# Patient Record
Sex: MALE | URBAN - METROPOLITAN AREA
[De-identification: names, ages, dates, MRNs, and addresses within clinical notes are randomized per-mention and may not be internally consistent; named-entity substitution may affect disease eponyms.]

---

## 2018-11-12 ENCOUNTER — OFFICE VISIT - HEALTHEAST (OUTPATIENT)
Dept: FAMILY MEDICINE | Facility: CLINIC | Age: 23
End: 2018-11-12

## 2018-11-12 DIAGNOSIS — J02.9 PHARYNGITIS, UNSPECIFIED ETIOLOGY: ICD-10-CM

## 2018-11-12 DIAGNOSIS — J02.9 SORE THROAT: ICD-10-CM

## 2018-11-12 DIAGNOSIS — J34.2 DEVIATED NASAL SEPTUM: ICD-10-CM

## 2018-11-12 LAB — DEPRECATED S PYO AG THROAT QL EIA: NORMAL

## 2018-11-12 ASSESSMENT — MIFFLIN-ST. JEOR: SCORE: 1771.83

## 2018-11-13 LAB — GROUP A STREP BY PCR: NORMAL

## 2018-11-14 ENCOUNTER — OFFICE VISIT - HEALTHEAST (OUTPATIENT)
Dept: OTOLARYNGOLOGY | Facility: CLINIC | Age: 23
End: 2018-11-14

## 2018-11-14 DIAGNOSIS — M95.0 ACQUIRED NASAL DEFORMITY: ICD-10-CM

## 2018-11-14 DIAGNOSIS — J34.2 DEVIATED NASAL SEPTUM: ICD-10-CM

## 2019-01-18 ENCOUNTER — OFFICE VISIT - HEALTHEAST (OUTPATIENT)
Dept: FAMILY MEDICINE | Facility: CLINIC | Age: 24
End: 2019-01-18

## 2019-01-18 DIAGNOSIS — R19.7 VOMITING AND DIARRHEA: ICD-10-CM

## 2019-01-18 DIAGNOSIS — R11.10 VOMITING AND DIARRHEA: ICD-10-CM

## 2019-01-18 ASSESSMENT — MIFFLIN-ST. JEOR: SCORE: 1744.32

## 2019-03-12 ENCOUNTER — OFFICE VISIT - HEALTHEAST (OUTPATIENT)
Dept: FAMILY MEDICINE | Facility: CLINIC | Age: 24
End: 2019-03-12

## 2019-03-12 DIAGNOSIS — R11.2 NON-INTRACTABLE VOMITING WITH NAUSEA, UNSPECIFIED VOMITING TYPE: ICD-10-CM

## 2019-03-28 ENCOUNTER — OFFICE VISIT - HEALTHEAST (OUTPATIENT)
Dept: FAMILY MEDICINE | Facility: CLINIC | Age: 24
End: 2019-03-28

## 2019-03-28 DIAGNOSIS — F43.22 ADJUSTMENT DISORDER WITH ANXIOUS MOOD: ICD-10-CM

## 2019-03-28 ASSESSMENT — MIFFLIN-ST. JEOR: SCORE: 1694.43

## 2019-05-21 ENCOUNTER — OFFICE VISIT - HEALTHEAST (OUTPATIENT)
Dept: FAMILY MEDICINE | Facility: CLINIC | Age: 24
End: 2019-05-21
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
## 2019-05-21 DIAGNOSIS — S06.0X0A CONCUSSION WITHOUT LOSS OF CONSCIOUSNESS, INITIAL ENCOUNTER: ICD-10-CM

## 2019-05-21 ASSESSMENT — MIFFLIN-ST. JEOR: SCORE: 1712.57

## 2019-07-09 ENCOUNTER — OFFICE VISIT - HEALTHEAST (OUTPATIENT)
Dept: FAMILY MEDICINE | Facility: CLINIC | Age: 24
End: 2019-07-09

## 2019-07-09 DIAGNOSIS — Z01.818 PRE-OPERATIVE GENERAL PHYSICAL EXAMINATION: ICD-10-CM

## 2019-07-09 DIAGNOSIS — B07.0 PLANTAR WART: ICD-10-CM

## 2019-07-09 DIAGNOSIS — S52.322A CLOSED DISPLACED TRANSVERSE FRACTURE OF SHAFT OF LEFT RADIUS, INITIAL ENCOUNTER: ICD-10-CM

## 2019-07-09 ASSESSMENT — MIFFLIN-ST. JEOR: SCORE: 1730.15

## 2019-07-10 ENCOUNTER — COMMUNICATION - HEALTHEAST (OUTPATIENT)
Dept: FAMILY MEDICINE | Facility: CLINIC | Age: 24
End: 2019-07-10

## 2019-07-31 ENCOUNTER — OFFICE VISIT - HEALTHEAST (OUTPATIENT)
Dept: FAMILY MEDICINE | Facility: CLINIC | Age: 24
End: 2019-07-31

## 2019-07-31 DIAGNOSIS — B07.0 PLANTAR WARTS: ICD-10-CM

## 2019-07-31 ASSESSMENT — MIFFLIN-ST. JEOR: SCORE: 1716.54

## 2019-10-01 ENCOUNTER — OFFICE VISIT - HEALTHEAST (OUTPATIENT)
Dept: FAMILY MEDICINE | Facility: CLINIC | Age: 24
End: 2019-10-01

## 2019-10-01 DIAGNOSIS — G43.711 INTRACTABLE CHRONIC MIGRAINE WITHOUT AURA AND WITH STATUS MIGRAINOSUS: ICD-10-CM

## 2019-10-01 DIAGNOSIS — B07.0 PLANTAR WARTS: ICD-10-CM

## 2019-10-01 DIAGNOSIS — R11.0 NAUSEA: ICD-10-CM

## 2019-10-01 ASSESSMENT — ANXIETY QUESTIONNAIRES
1. FEELING NERVOUS, ANXIOUS, OR ON EDGE: NOT AT ALL
4. TROUBLE RELAXING: SEVERAL DAYS
6. BECOMING EASILY ANNOYED OR IRRITABLE: NOT AT ALL
2. NOT BEING ABLE TO STOP OR CONTROL WORRYING: NOT AT ALL
3. WORRYING TOO MUCH ABOUT DIFFERENT THINGS: SEVERAL DAYS
GAD7 TOTAL SCORE: 3
IF YOU CHECKED OFF ANY PROBLEMS ON THIS QUESTIONNAIRE, HOW DIFFICULT HAVE THESE PROBLEMS MADE IT FOR YOU TO DO YOUR WORK, TAKE CARE OF THINGS AT HOME, OR GET ALONG WITH OTHER PEOPLE: SOMEWHAT DIFFICULT
5. BEING SO RESTLESS THAT IT IS HARD TO SIT STILL: SEVERAL DAYS
7. FEELING AFRAID AS IF SOMETHING AWFUL MIGHT HAPPEN: NOT AT ALL

## 2019-10-01 ASSESSMENT — PATIENT HEALTH QUESTIONNAIRE - PHQ9: SUM OF ALL RESPONSES TO PHQ QUESTIONS 1-9: 1

## 2019-10-01 ASSESSMENT — MIFFLIN-ST. JEOR: SCORE: 1739.22

## 2020-03-12 ENCOUNTER — OFFICE VISIT - HEALTHEAST (OUTPATIENT)
Dept: FAMILY MEDICINE | Facility: CLINIC | Age: 25
End: 2020-03-12

## 2020-03-12 ENCOUNTER — COMMUNICATION - HEALTHEAST (OUTPATIENT)
Dept: SCHEDULING | Facility: CLINIC | Age: 25
End: 2020-03-12

## 2020-03-12 DIAGNOSIS — R41.840 POOR CONCENTRATION: ICD-10-CM

## 2020-03-12 DIAGNOSIS — J02.9 SORE THROAT: ICD-10-CM

## 2020-03-12 LAB — DEPRECATED S PYO AG THROAT QL EIA: NORMAL

## 2020-03-12 ASSESSMENT — ANXIETY QUESTIONNAIRES
4. TROUBLE RELAXING: NOT AT ALL
3. WORRYING TOO MUCH ABOUT DIFFERENT THINGS: MORE THAN HALF THE DAYS
IF YOU CHECKED OFF ANY PROBLEMS ON THIS QUESTIONNAIRE, HOW DIFFICULT HAVE THESE PROBLEMS MADE IT FOR YOU TO DO YOUR WORK, TAKE CARE OF THINGS AT HOME, OR GET ALONG WITH OTHER PEOPLE: SOMEWHAT DIFFICULT
2. NOT BEING ABLE TO STOP OR CONTROL WORRYING: SEVERAL DAYS
6. BECOMING EASILY ANNOYED OR IRRITABLE: SEVERAL DAYS
5. BEING SO RESTLESS THAT IT IS HARD TO SIT STILL: SEVERAL DAYS
1. FEELING NERVOUS, ANXIOUS, OR ON EDGE: SEVERAL DAYS
7. FEELING AFRAID AS IF SOMETHING AWFUL MIGHT HAPPEN: NOT AT ALL
GAD7 TOTAL SCORE: 6

## 2020-03-12 ASSESSMENT — MIFFLIN-ST. JEOR: SCORE: 1712.01

## 2020-03-12 ASSESSMENT — PATIENT HEALTH QUESTIONNAIRE - PHQ9: SUM OF ALL RESPONSES TO PHQ QUESTIONS 1-9: 8

## 2020-03-13 LAB — GROUP A STREP BY PCR: NORMAL

## 2020-03-25 ENCOUNTER — VIRTUAL VISIT (OUTPATIENT)
Dept: FAMILY MEDICINE | Facility: OTHER | Age: 25
End: 2020-03-25

## 2020-03-25 NOTE — PROGRESS NOTES
"Date: 2020 13:22:42  Clinician: Oz Emmanuel  Clinician NPI: [id number]  Patient: Aureliano Silverio  Patient : 1995  Patient Address: [address] [place] [id number]  Patient Phone: [phone]  Visit Protocol: URI  Patient Summary:  Aureliano is a 25 year old ( : 1995 ) male who initiated a Visit for COVID-19 (Coronavirus) evaluation and screening. When asked the question \"Please sign me up to receive news, health information and promotions from [employer].\", Aureliano responded \"Yes\".    Aureliano states his symptoms started gradually 7-9 days ago. After his symptoms started, they improved and then got worse again.   His symptoms consist of malaise, chills, ear pain, rhinitis, wheezing, a sore throat, a cough, and nasal congestion. He is experiencing mild difficulty breathing with activities but can speak normally in full sentences.   Symptom details     Nasal secretions: The color of his mucus is yellow and green.    Cough: Aureliano coughs every 5-10 minutes and his cough is not more bothersome at night. Phlegm comes into his throat when he coughs. He does not believe his cough is caused by post-nasal drip. The color of the phlegm is yellow and green.     Sore throat: Aureliano reports having mild throat pain (1-3 on a 10 point pain scale), does not have exudate on his tonsils, and can swallow liquids. He is not sure if the lymph nodes in his neck are enlarged. A rash has not appeared on the skin since the sore throat started.     Wheezing: Aureliano has not ever been diagnosed with asthma. The wheezing interferes with his normal daily activities.     Aureliano denies having teeth pain, headache, fever, facial pain or pressure, and myalgias. He also denies having a sinus infection within the past year, taking antibiotic medication for the symptoms, and having recent facial or sinus surgery in the past 60 days.   Precipitating events  Within the past week, Aureliano has not been exposed to someone with strep throat. He has " not recently been exposed to someone with influenza. Aureliano has been in close contact with the following high risk individuals: immunocompromised people.   Pertinent COVID-19 (Coronavirus) information  Aureliano has not traveled internationally or to the areas where COVID-19 (Coronavirus) is widespread, including cruise ship travel in the last 14 days before the start of his symptoms.   Aureliano has not had a close contact with a laboratory-confirmed COVID-19 patient within 14 days of symptom onset. He has had a close contact with a suspected COVID-19 patient within 14 days of symptom onset. Additional information about contact with COVID-19 (Coronavirus) patient as reported by the patient (free text): A coworker was sick and had to get tested for COVID-19 and tested negative.   Aureliano is not a healthcare worker or a  and does not work in a healthcare facility. He is not a family member of a healthcare worker and does not live with someone who is a healthcare worker.   Triage Point(s) temporarily suspended for COVID-19 (Coronavirus) screening  Aureliano reported the following symptoms which were previously protocol referral points. These protocol referral points have temporarily been removed for purposes of COVID-19 (Coronavirus) screening.   Wheezing that keeps Aureliano from doing daily activities   Pertinent medical history  Aureliano does not need a return to work/school note.   Weight: 170 lbs   Aureliano does not smoke or use smokeless tobacco.   Weight: 170 lbs    MEDICATIONS: No current medications, ALLERGIES: NKDA  Clinician Response:  Dear Aureliano,  Based on the information provided, you have viral bronchitis, also known as a chest cold. This is a cough that occurs when a cold or other virus settles into your chest. The cough may be dry or you could notice you are coughing up some phlegm.  It is not unusual for a cough to last 3 weeks or more. Treatment focuses on controlling your symptoms as much as possible while you  recover.  Medication information  Because you have a viral infection, antibiotics will not help you get better. Treating a viral infection with antibiotics could actually make you feel worse.  I am prescribing:     Ventolin HFA 90 mcg/actuation aerosol inhaler. Inhale 2 puffs every 4-6 hours as needed for 5 days. There are no refills with this prescription.   Self care  Steps you can take to be as comfortable as possible:     Rest.    Drink plenty of water and other liquids.    Take a hot shower to loosen congestion    Use throat lozenges.    Gargle with warm salt water (1/4 teaspoon of salt per 8 ounce glass of water).    Suck on frozen items such as popsicles or ice cubes.    Drink hot tea with lemon and honey.    Take a spoonful of honey to reduce your cough.     When to seek care  Please be seen in a clinic or urgent care if new symptoms develop, or symptoms become worse.  Call 911 or go to the emergency room if you feel that your throat is closing off, you suddenly develop a rash, you are unable to swallow fluids, you are drooling, or you are having difficulty breathing.  COVID-19 (Coronavirus) General Information  With the increase in the number of COVID-19 (Coronavirus) cases, we understand you may have some questions. Below is some helpful information on COVID-19 (Coronavirus).  How can I protect myself and others from the COVID-19 (Coronavirus)?  Because there is currently no vaccine to prevent infection, the best way to protect yourself is to avoid being exposed to this virus. Put distance between yourself and other people if COVID-19 (Coronavirus) is spreading in your community. The virus is thought to spread mainly from person-to-person.     Between people who are in close contact with one another (within about 6 about) for a prolonged period (10 minutes or longer).    Through respiratory droplets produced when an infected person coughs or sneezes.     The CDC recommends the following additional steps to  protect yourself and others:     Wash your hands often with soap and water for at least 20 seconds, especially after blowing your nose, coughing, or sneezing; going to the bathroom; and before eating or preparing food.  Use an alcohol-based hand  that contains at least 60 percent alcohol if soap and water are not available.        Avoid touching your eyes, nose and mouth with unwashed hands.    Avoid close contact with people who are sick.    Stay home when you are sick.    Cover your cough or sneeze with a tissue, then throw the tissue in the trash.    Clean and disinfect frequently touched objects and surfaces.     You can help stop COVID-19 (Coronavirus) by knowing the signs and symptoms:     Fever    Cough    Shortness of breath     Contact your healthcare provider if   Develop symptoms   AND   Have been in close contact with a person known to have COVID-19 (Coronavirus) or live in or have recently traveled from an area with ongoing spread of COVID-19 (Coronavirus). Call ahead before you go to a doctor's office or emergency room. Tell them about your recent travel and your symptoms.   For the most up to date information, visit the CDC's website.  Self-monitoring  Self-monitoring means people should monitor themselves for fever by taking their temperatures twice a day and remain alert for a cough or difficulty breathing.  It is important to check your health two times each day for 14 days after a potential exposure to a person with COVID-19 (Coronavirus) or after travel from a location where COVID-19 (Coronavirus) is widespread. If you have been exposed to a person with COVID-19 (Coronavirus), it may take up to 14 days to know if you will get sick. Follow the steps below to check and record your health.     Take your temperature with a thermometer twice a day, once in the morning and once in the evening, and watch for a cough or difficulty breathing for 14 days.    Write down your temperature and any  COVID-19 symptoms you may have: feeling feverish, coughing, or difficulty breathing.    Stay home from work or school.    Do not take public transportation, taxis, or ride-shares.    Avoid crowded places (such as shopping centers and movie theaters) and limit your activities in public.    Keep your distance from others (about 6 feet or 2 meters).    If you get sick with fever, cough, or trouble breathing, contact your healthcare provider and tell them about your recent travel and/or your symptoms.    If you need to seek medical care for other reasons, such as dialysis, call ahead to your doctor and tell them about your recent travel.     Steps to help prevent the spread of COVID-19 (Coronavirus) if you are sick  If you are sick with COVID-19 (Coronavirus) or suspect you are infected with the virus that causes COVID-19 (Coronavirus), follow the steps below to help prevent the disease from spreading&nbsp;to people in your home and community.     Stay home except to get medical care. Home isolation may be started in consultation with your healthcare clinician.    Separate yourself from other people and animals in your home.    Call ahead before visiting your doctor if you have a medical appointment.    Wear a facemask when you are around other people.    Cover your cough and sneezes.    Clean your hands often.    Avoid sharing personal household items.    Clean and disinfect frequently touched objects and surfaces everyday.    You will need to have someone drop off medications or household supplies (if needed) at your house without coming inside or in contact with you or others living in your house.    Monitor your symptoms and seek prompt medical care if your illness is worsening (e.g. Difficulty breathing).    Discontinue home isolation only in consultation with your healthcare provider.     For more detailed and up to date information on what to do if you are sick, visit this link: What to Do If You Are Sick With  "COVID-19.  Do I need to be tested for COVID-19 (Coronavirus)?     Not everyone needs to be tested for COVID-19 (Coronavirus). Decisions on which patients receive testing will be based on the local spread of COVID-19 (Coronavirus) as well as the symptoms. Your healthcare provider will make the final decision on whether you should be tested.    In the meantime, if you have concerns that you may have been exposed, it is reasonable to practice \"social distancing.\"&nbsp; If you are ill with a cold or flu-like illness, please monitor your symptoms and call your healthcare provider if your symptoms worsen.    For more up to date information, visit this link: COVID-19 (Coronavirus) Frequently Asked Questions and Answers.      Diagnosis: Viral bronchitis  Diagnosis ICD: J40  Prescription: Ventolin HFA 90 mcg/actuation inhalation HFA aerosol inhaler 1 200 inhalation canister, 5 days supply. Inhale 2 puffs every 4-6 hours as needed for 5 days. Refills: 0, Refill as needed: no, Allow substitutions: yes  "

## 2021-01-04 ENCOUNTER — HEALTH MAINTENANCE LETTER (OUTPATIENT)
Age: 26
End: 2021-01-04

## 2021-05-26 ASSESSMENT — PATIENT HEALTH QUESTIONNAIRE - PHQ9: SUM OF ALL RESPONSES TO PHQ QUESTIONS 1-9: 1

## 2021-05-27 ASSESSMENT — PATIENT HEALTH QUESTIONNAIRE - PHQ9: SUM OF ALL RESPONSES TO PHQ QUESTIONS 1-9: 8
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
## 2021-05-27 NOTE — PATIENT INSTRUCTIONS - HE
I'll follow up with the specialty schedulers to give you a call to help get set up with the therapists.    If anything changes in the mean time, let me know.    Thank you for coming in today!    If you receive a survey from [employer] about your experience today, it would be very helpful if you could fill it out to let us know what went well and what we can improve!    General Information:    Today you had your appointment with Milton Wright NP    My hours are:    Monday : Out of clinic  Tuesday : 8:00AM - 5:00 PM  Wednesday: 8:00AM - 5:00 PM  Thursday: 8:00AM - 5:00 PM  Friday: 8:00AM - 5:00 PM    I am not in the office Mondays. Therefore non-urgent calls and medical messages received on Monday will be addressed when I am back in the office on Tuesday. Urgent matters will be reviewed and addressed by one of my partners in the office as needed.    If lab work was done today as part of your evaluation you will generally be contacted via [employer], mail, or phone with the results within 1-5 days. If there is an alarming result we will contact you by phone. Lab results come back at varying times, I generally wait until all lab results are available before making comments on the results.     If you need refills please contact your pharmacy. They will send a refill request to me to review. Please allow 3-5 business days for us to process all refill requests.     My Clinical Assistant is Shameka. Please call us at [phone] or send a medical message with any questions or concerns.

## 2021-05-28 ASSESSMENT — ANXIETY QUESTIONNAIRES
GAD7 TOTAL SCORE: 3
GAD7 TOTAL SCORE: 6

## 2021-05-29 ENCOUNTER — RECORDS - HEALTHEAST (OUTPATIENT)
Dept: ADMINISTRATIVE | Facility: CLINIC | Age: 26
End: 2021-05-29

## 2021-05-29 NOTE — PATIENT INSTRUCTIONS - HE
Looks like you probably have a concussion. This should get better with time, but if still struggling after a month, let me know and we can get you into the concussion clinic.    Brain rest is best for treating this. Avoid screens (TV, phone, etc), physical exertion, and tasks that require significant mental focus.    Over the counter pain medications can be used for headaches along with being in a dark room.      Thank you for coming in today!    If you receive a survey from [employer] about your experience today, it would be very helpful if you could fill it out to let us know what went well and what we can improve!    General Information:    Today you had your appointment with Milton Wright NP    My hours are:    Monday : Out of clinic  Tuesday : 8:00AM - 5:00 PM  Wednesday: 8:00AM - 5:00 PM  Thursday: 8:00AM - 5:00 PM  Friday: 8:00AM - 5:00 PM    I am not in the office Mondays. Therefore non-urgent calls and medical messages received on Monday will be addressed when I am back in the office on Tuesday. Urgent matters will be reviewed and addressed by one of my partners in the office as needed.    If lab work was done today as part of your evaluation you will generally be contacted via [employer], mail, or phone with the results within 1-5 days. If there is an alarming result we will contact you by phone. Lab results come back at varying times, I generally wait until all lab results are available before making comments on the results.     If you need refills please contact your pharmacy. They will send a refill request to me to review. Please allow 3-5 business days for us to process all refill requests.     My Clinical Assistant is Shameka. Please call us at [phone] or send a medical message with any questions or concerns.
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
## 2021-05-30 NOTE — TELEPHONE ENCOUNTER
Spoke with pharmacist. Rx went through as is when they ran it through again. No further action needed. H.DeGree, CMA

## 2021-05-30 NOTE — TELEPHONE ENCOUNTER
FYI - Status Update  Who is Calling: Patient  Update: Patient called requesting an update on the pharmacy's request listed below.  Caller is questioning if a covering provider would be willing to address this for him?  Okay to leave a detailed message?:  Yes   [phone]

## 2021-05-30 NOTE — TELEPHONE ENCOUNTER
[facility] PA team  [phone]  Pool: [facility] PA MED ([id number])          PA has been initiated.       PA form completed and faxed insurance via Cover My Meds     Key:   [id number] - [id number]     Medication:  HYDROcodone-Acetaminophen 5-325MG tablets    Insurance:  P1        Response will be received via fax and may take up to 5-10 business days depending on plan
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
## 2021-05-30 NOTE — TELEPHONE ENCOUNTER
Fax received from [facility], they have started the Prior Authorization Process via Cover My Meds    CoverMyMeds Key: [id number]    Medication Name:   HYDROcodone-acetaminophen 5-325 mg per tablet 50 tablet 0 7/9/2019 7/16/2019    Sig - Route: Take 1-2 tablets by mouth every 6 (six) hours as needed for pain. - Oral    Sent to pharmacy as: HYDROcodone-acetaminophen 5-325 mg per tablet    Earliest Fill Date: 7/9/2019    E-Prescribing Status: Receipt confirmed by pharmacy (7/9/2019 12:33 PM CDT)          Insurance Plan: [employer]  PBM:   Patient ID: [id number]    Please complete the PA process

## 2021-05-30 NOTE — TELEPHONE ENCOUNTER
Medication Question or Clarification  Who is calling: Patient  What medication are you calling about? (include dose and sig)    Disp Refills Start End    HYDROcodone-acetaminophen 5-325 mg per tablet 50 tablet 0 7/9/2019 7/16/2019    Sig - Route: Take 1-2 tablets by mouth every 6 (six) hours as needed for pain. - Oral    Sent to pharmacy as: HYDROcodone-acetaminophen 5-325 mg per tablet    Earliest Fill Date: 7/9/2019    E-Prescribing Status: Receipt confirmed by pharmacy (7/9/2019 12:33 PM CDT)      Who prescribed the medication?: . FARHAD Wright  What is your question/concern?: Pharmacy would not fill medication told patient needed to verify  reason  for large quantity.   Pharmacy: [id number]  Okay to leave a detailed message?: No  Site CMT - Please call the pharmacy to obtain any additional needed information.

## 2021-05-30 NOTE — PATIENT INSTRUCTIONS - HE
Refill of pain medicine at the pharmacy. This should last you until surgery.    For the warts, they'll blister and if they pop, put a band-aid over them.  If still there after 3 weeks, we can hit them up again.    Nothing to eat or drink after midnight    You can take your usual medications after surgery.    No advil, ibuprofen, aleve, naproxen, or aspirin a week before surgery. No fish oil, vitamin E, or ginko 2 weeks prior to surgery. Don't start any new supplements    Your surgeon will manage your pain after your procedure.      Thank you for coming in today!    If you receive a survey from [employer] about your experience today, it would be very helpful if you could fill it out to let us know what went well and what we can improve!    General Information:    Today you had your appointment with Milton Wright NP    My hours are:    Monday : Out of clinic  Tuesday : 8:00AM - 5:00 PM  Wednesday: 8:00AM - 5:00 PM  Thursday: 8:00AM - 5:00 PM  Friday: 8:00AM - 5:00 PM    I am not in the office Mondays. Therefore non-urgent calls and medical messages received on Monday will be addressed when I am back in the office on Tuesday. Urgent matters will be reviewed and addressed by one of my partners in the office as needed.    If lab work was done today as part of your evaluation you will generally be contacted via [email or web address], mail, or phone with the results within 1-5 days. If there is an alarming result we will contact you by phone. Lab results come back at varying times, I generally wait until all lab results are available before making comments on the results.     If you need refills please contact your pharmacy. They will send a refill request to me to review. Please allow 3-5 business days for us to process all refill requests.     My Clinical Assistant is Shameka. Please call us at [phone] or send a medical message with any questions or concerns.

## 2021-05-31 NOTE — PROGRESS NOTES
"WART TREATMENT  Patient: Aureliano Silverio   1995       MRN [id number] PCP: Milton Wright CNP  CLINIC PHONE: [phone]       [facility] #[id number] - [place], [place] - [address] AT [facility] [address]  [address]  [place] [address]  Phone: [phone] Fax: [phone]      CHIEF COMPLAINT: Warts (2nd wart treatment on bottom of left foot. More painful. )    SUBJECTIVE:  Aureliano Silverio is a 24 y.o. male.  History of recurrent warts, status post treatment in the past with cryotherapy.    REVIEW OF SYSTEMS: No pain.    OBJECTIVE:    /82   Pulse 60   Temp 98.1  F (36.7  C) (Oral)   Ht 5' 8\" (1.727 m)   Wt 168 lb (76.2 kg)   SpO2 97%   BMI 25.54 kg/m    GENERAL: No acute distress.  SKIN - Verrucous skin-colored lesions.    PROCEDURE NOTE:  Verbal consent obtained.  Cryotherapy freeze-thaw cycle times three to 2 lesions.  Patient tolerated procedure well.  No complications.    PLAN:  Cryotherapy as above.  If not improving, then dermatology referral.    Milton Wright CNP          "

## 2021-06-01 NOTE — PROGRESS NOTES
"Chief Complaint   Patient presents with     Nasal Congestion     Blowing nose a lot. Blood also coming out. Random clots.      Facial Pain     Symptoms started yesterday.      Migraine     Yesterday and today. Tried excedrin but did not help. Also not sleeping.      Emesis     Last night. Possibly from migraines.      Warts     Left foot. Today would be 3rd treatment.        HPI: Patient presents today with complaints of a headache/migraine present for the past day.  He gets a migraine approximately once a month and says that usually when he takes an Excedrin at the beginning of the headache, symptoms do not progress this bad.  The pain is primarily in his forehead.  He is also had some increased nasal congestion and facial pain along his maxillary sinuses.  No vision changes, numbness, tingling, confusion, slurred speech.  He says that the headache caused nausea last night to the point that he threw up, but he has not had any recurrence.  No personal history of blood clots.  His last sinus infection was about a year ago.  No severe neck stiffness.  Able to touch his chin to his chest.    Patient continues to have a persistent plantar warts on his left foot.  The smaller ones that we had done cryotherapy on have resolved, but the larger one remains.  He says that it does feel softer than when we first treated it, but it still persist.  He has had 2 cryotherapy attempts on it already.    ROS:Review of Systems - negative except for what's listed in the HPI    SH: The Patient's  reports that he has never smoked. He has never used smokeless tobacco. He reports that he does not drink alcohol or use drugs.      FH: The Patient's family history includes No Medical Problems in his father and mother.     Meds:    No current outpatient medications on file prior to visit.     No current facility-administered medications on file prior to visit.        O:  /88   Pulse 63   Temp 97.9  F (36.6  C) (Oral)   Ht 5' 8\" (1.727 " m)   Wt 173 lb (78.5 kg)   SpO2 97%   BMI 26.30 kg/m      Physical Examination:   General appearance - alert, well appearing, and in no distress  Mental status - alert, oriented to person, place, and time  Eyes - pupils equal and reactive, extraocular eye movements intact  Ears - bilateral TM's and external ear canals normal  Nose -scant drainage.  No blood.  Mouth - mucous membranes moist, pharynx normal without lesions  Neck - supple, no significant adenopathy  Lymphatics - no palpable lymphadenopathy, no hepatosplenomegaly  Chest - clear to auscultation, no wheezes, rales or rhonchi, symmetric air entry  Heart - normal rate and regular rhythm, S1 and S2 normal, no murmurs noted  Neurological - alert, oriented, normal speech, no focal findings or movement disorder noted, neck supple without rigidity, cranial nerves II through XII intact, motor and sensory grossly normal bilaterally, normal muscle tone, no tremors, strength 5/5, negative Kernig and Brudzinski sign  Extremities - peripheral pulses normal, no pedal edema, no clubbing or cyanosis  Skin -verrucous lesion along the plantar aspect of the left foot near the ball.      A/P:     Problem List Items Addressed This Visit     None      Visit Diagnoses     Intractable chronic migraine without aura and with status migrainosus    -  Primary    Relevant Medications    ketorolac injection 60 mg (TORADOL) (Completed)    Nausea        Relevant Medications    ondansetron (ZOFRAN-ODT) 8 MG disintegrating tablet    Plantar warts        Relevant Orders    Ambulatory referral to Dermatology        For the headache, completely normal neuro exam is reassuring.  This feels similar to past migraines, and it could also be the start of a sinusitis as well given location.  Trial of ketorolac injection given.  Prescription for ondansetron sent to the pharmacy.  Told the patient also to try over-the-counter Benadryl when he gets home as well.  He was advised that should he  develop any neurological deficits or the worst headache of his life, then he needs evaluation in the emergency department.  If the headache is not worsening but persisting, he was told to let me know and we will get imaging of the head.    Plantar wart was pared down with a #15 blade.  It does appear softer than the last time I done it which is encouraging.  After verbal consent was obtained cryotherapy applied X5 seconds X3 times.  Well-tolerated.  Discussed with patient that next steps are dermatology if this fails to find improvement so a referral was placed, and if the wart resolves, he can just cancel that appointment.    1. Intractable chronic migraine without aura and with status migrainosus    - ketorolac injection 60 mg (TORADOL)    2. Nausea  - ondansetron (ZOFRAN-ODT) 8 MG disintegrating tablet; Take 1 tablet (8 mg total) by mouth every 8 (eight) hours as needed for nausea.  Dispense: 6 tablet; Refill: 0    3. Plantar warts  - Ambulatory referral to Dermatology        Milton Wright, CNP

## 2021-06-01 NOTE — PATIENT INSTRUCTIONS - HE
"You received an injection of Toradol today.  I also sent in a prescription for ondansetron (zofran) for nausea.  Take 50 mg of diphenhydramine (Benadryl) this evening.  Hopefully a combination of these interventions will help with your headache.    The worst headache of your life or any neurological deficits require emergency department evaluation.    If things are not getting worse but are persisting, let me know and I can order a CT scan of the head.    You can try the over the counter saline nasal rinse called \"Art Med\". I find the squirt bottle is much easier to use than the teapot. This is over the counter and can be found at most pharmacies.    You can try over the counter flonase again as well if that was helpful for your sinus issues in the past.    If that wart is still persisting, let me know and I will be happy to get you connected to dermatology.    Thank you for coming in today!    If you receive a survey from [employer] about your experience today, it would be very helpful if you could fill it out to let us know what went well and what we can improve!    General Information:    Today you had your appointment with Milton Wright NP    My hours are:    Monday : Out of clinic  Tuesday : 8:00AM - 5:00 PM  Wednesday: 8:00AM - 5:00 PM  Thursday: 8:00AM - 5:00 PM  Friday: 8:00AM - 5:00 PM    I am not in the office Mondays. Therefore non-urgent calls and medical messages received on Monday will be addressed when I am back in the office on Tuesday. Urgent matters will be reviewed and addressed by one of my partners in the office as needed.    If lab work was done today as part of your evaluation you will generally be contacted via [employer], mail, or phone with the results within 1-5 days. If there is an alarming result we will contact you by phone. Lab results come back at varying times, I generally wait until all lab results are available before making comments on the results.     If you need refills please " contact your pharmacy. They will send a refill request to me to review. Please allow 3-5 business days for us to process all refill requests.     My Clinical Assistant is Shameka. Please call us at [phone] or send a medical message with any questions or concerns.

## 2021-06-02 VITALS — WEIGHT: 164 LBS | HEIGHT: 68 IN | BODY MASS INDEX: 24.86 KG/M2

## 2021-06-02 VITALS — WEIGHT: 169.8 LBS | BODY MASS INDEX: 26.01 KG/M2

## 2021-06-02 VITALS — HEIGHT: 68 IN | WEIGHT: 175 LBS | BODY MASS INDEX: 26.52 KG/M2

## 2021-06-02 VITALS — BODY MASS INDEX: 27.17 KG/M2 | WEIGHT: 179.31 LBS | HEIGHT: 68 IN

## 2021-06-03 VITALS
WEIGHT: 173 LBS | OXYGEN SATURATION: 97 % | SYSTOLIC BLOOD PRESSURE: 126 MMHG | DIASTOLIC BLOOD PRESSURE: 88 MMHG | HEART RATE: 63 BPM | BODY MASS INDEX: 26.22 KG/M2 | HEIGHT: 68 IN | TEMPERATURE: 97.9 F

## 2021-06-03 VITALS — HEIGHT: 68 IN | WEIGHT: 168 LBS | BODY MASS INDEX: 25.46 KG/M2

## 2021-06-03 VITALS — BODY MASS INDEX: 25.46 KG/M2 | WEIGHT: 168 LBS | HEIGHT: 68 IN

## 2021-06-03 VITALS — BODY MASS INDEX: 25.91 KG/M2 | HEIGHT: 68 IN | WEIGHT: 171 LBS

## 2021-06-04 VITALS
BODY MASS INDEX: 25.31 KG/M2 | OXYGEN SATURATION: 98 % | DIASTOLIC BLOOD PRESSURE: 84 MMHG | SYSTOLIC BLOOD PRESSURE: 132 MMHG | HEART RATE: 48 BPM | HEIGHT: 68 IN | WEIGHT: 167 LBS | TEMPERATURE: 98.4 F

## 2021-06-06 NOTE — TELEPHONE ENCOUNTER
Sore throat x 2 days.    No fever or HA/cough.    Wants to be seen for sore throat in clinic.    Transferred to scheduling for an appointment.    Kirsten Jones RN  Triage Nurse Advisor        Reason for Disposition    Sore throat with cough/cold symptoms present > 5 days    Protocols used: SORE THROAT-A-OH

## 2021-06-06 NOTE — PATIENT INSTRUCTIONS - HE
Your rapid strep test is negative.  We will culture this to confirm.    For the symptoms, you can try warm salt water gargles and 2-4 ibuprofen every 4-8 hours as needed.  Take with food so it sits easier in the stomach.    If the sore throat persists for >7 days, let me know.    Your concentration difficulties could be ADD or another underlying problem.  For adults, I have them undergo an evaluation to confirm that ADD is the true cause of the issue.  You should be getting a call about getting this set up.  When I get the results, we can discuss it and come up with a plan.    Thank you for coming in today!    If you receive a survey from [employer] about your experience today, it would be very helpful if you could fill it out to let us know what went well and what we can improve!    General Information:    Today you had your appointment with Milton Wright NP    My hours are:    Monday : Out of clinic  Tuesday : 8:00AM - 5:00 PM  Wednesday: 8:00AM - 5:00 PM  Thursday: 8:00AM - 5:00 PM  Friday: 8:00AM - 5:00 PM    I am not in the office Mondays. Therefore non-urgent calls and medical messages received on Monday will be addressed when I am back in the office on Tuesday. Urgent matters will be reviewed and addressed by one of my partners in the office as needed.    If lab work was done today as part of your evaluation you will generally be contacted via [email or web address], mail, or phone with the results within 1-5 days. If there is an alarming result we will contact you by phone. Lab results come back at varying times, I generally wait until all lab results are available before making comments on the results.     If you need refills please contact your pharmacy. They will send a refill request to me to review. Please allow 3-5 business days for us to process all refill requests.     My Clinical Assistant is Shameka. Please call us at [phone] or send a medical message with any questions or concerns.

## 2021-06-06 NOTE — PROGRESS NOTES
Chief Complaint   Patient presents with     Sore Throat     For the last 2 days. Today is worse.      ADD     States that ADD is prominent in family. Seems to be not finishing tasks lately.        HPI: Patient presents today with concerns about a sore throat for the past 2 days that has slowly been worsening.  Sick contacts include coworkers with no specific diagnoses.  No worsening sinus congestion and no ear pain.  Denies eye itchiness/discharge.  No body aches.  Afebrile without chills.  He has had strep before and is status post tonsillectomy from 2005.  No abnormal rashes on the body.  No travel.  No exposure to anyone with COVID-19    Patient also notes that he has been struggling with concentration issues.  He notes that ADD is very prominent in his family.  He finds that he is having trouble completing tasks.  He has never been assessed for this before.  In the past he had struggled with homework growing up but was able to come up with a plan to be a good student.  Denies depression or anxiety issues, but he does admit that when he struggles with completing tasks that causes him to feel more down.  No suicidal or homicidal ideation.    PHQ-9 Total Score: 8 (3/12/2020  3:00 PM)  NACHO 7 Total Score: 6 (3/12/2020  3:00 PM)    ROS:Review of Systems - negative except for what's listed in the HPI    SH: The Patient's  reports that he has never smoked. He has never used smokeless tobacco. He reports that he does not drink alcohol or use drugs.      FH: The Patient's family history includes No Medical Problems in his father and mother.     Meds:    Current Outpatient Medications on File Prior to Visit   Medication Sig Dispense Refill     ondansetron (ZOFRAN-ODT) 8 MG disintegrating tablet Take 1 tablet (8 mg total) by mouth every 8 (eight) hours as needed for nausea. 6 tablet 0     No current facility-administered medications on file prior to visit.        O:  /84   Pulse (!) 48   Temp 98.4  F (36.9  C)  "(Oral)   Ht 5' 8\" (1.727 m)   Wt 167 lb (75.8 kg)   SpO2 98%   BMI 25.39 kg/m      Physical Examination:   General appearance - alert, well appearing, and in no distress  Mental status - alert, oriented to person, place, and time.  Appropriately dressed.  Thought process clear and linear.  Makes appropriate eye contact.  Eyes -PERRLA, conjunctiva pink  Ears - bilateral TM's and external ear canals normal  Nose - normal and patent, no erythema, discharge or polyps  Mouth - mucous membranes moist, pharynx erythematous.  No exudate  Neck - no significant adenopathy  Lymphatics - no palpable lymphadenopathy, no hepatosplenomegaly  Chest - clear to auscultation, no wheezes, rales or rhonchi, symmetric air entry  Heart - normal rate and regular rhythm, S1 and S2 normal, no murmurs noted  Extremities - peripheral pulses normal, no pedal edema, no cyanosis  Skin - normal coloration and turgor.      A/P:     Problem List Items Addressed This Visit     None      Visit Diagnoses     Sore throat    -  Primary    Relevant Orders    Rapid Strep A Screen-Throat (Completed)    Group A Strep, RNA Direct Detection, Throat (Completed)    Poor concentration        Relevant Orders    AMB REFERRAL TO MENTAL HEALTH AND ADDICTION  - Adult (18+); Assessment and Testing; ADHD; [facility] 2 [phone]; We will contact you to schedule the appointment or please call with any questions; External Referral            1. Sore throat  Rapid strep negative.  Await culture.  Likely viral in origin.  Continue to monitor.  Discussed supportive measures.  Let me know if sore throat does not improve >7 days.    - Rapid Strep A Screen-Throat  - Group A Strep, RNA Direct Detection, Throat    2. Poor concentration  Persistent issue starting to become more bothersome.  Less likely depression.  Dysthymia issues are more likely a side effect from the frustration with being unable to complete tasks.  Start with evaluation for " ADD/ADHD.  Referral placed.  We will follow-up with the patient based on test results to come up with treatment plans.  We briefly discussed treatments including cognitive behavioral therapy and stimulant versus non-stimulant medication.    - AMB REFERRAL TO MENTAL HEALTH AND ADDICTION  - Adult (18+); Assessment and Testing; ADHD; [facility] 1 [phone]; We will contact you to schedule the appointment or please call with any questions; External Referral        Milton Wright, CNP

## 2021-06-16 NOTE — TELEPHONE ENCOUNTER
Telephone Encounter by Lucy Cuello at 7/11/2019 10:05 AM     Author: Lucy Cuello Service: -- Author Type: --    Filed: 7/11/2019 10:06 AM Encounter Date: 7/10/2019 Status: Signed    : Lucy Cuello APPROVED:    Approval start date:07/10/2019  Approval end date:08/21/2019    Pharmacy has been notified of approval and will contact patient when medication is ready for pickup.

## 2021-06-18 NOTE — LETTER
Letter by Milton Wright CNP at      Author: Milton Wright CNP Service: -- Author Type: --    Filed:  Encounter Date: 1/18/2019 Status: (Other)           Work note  Aureliano Silverio was seen at the doctor on 1/18/2019. Please excuse his absence from work for illness.   Remarks: If you have any further questions please call our office.    Sincerely,        Milton Wright CNP  1/18/2019  12:02 PM

## 2021-06-18 NOTE — LETTER
Letter by Milton Wright CNP at      Author: Milton Wright CNP Service: -- Author Type: --    Filed:  Encounter Date: 3/12/2019 Status: (Other)             Work note  Aureliano Silverio was seen at the doctor on 3/12/2019. Please excuse his absence from work for illness from 3/11/19-3/15/19 unless he's feeling better.   Remarks: If you have any further questions please call our office.    Sincerely,        Milton Wright CNP  3/12/2019  5:07 PM

## 2021-06-19 NOTE — LETTER
Letter by Milton Wright CNP at      Author: Milton Wright CNP Service: -- Author Type: --    Filed:  Encounter Date: 10/1/2019 Status: Signed         Work note  Aureliano Silverio was seen at the doctor on 10/1/2019. Please excuse his absence from work for illness until 10/4/19 unless he's feeling better.   Remarks: If you have any further questions please call our office.    Sincerely,        Milton Wright CNP  10/1/2019  5:00 PM

## 2021-06-19 NOTE — LETTER
Letter by Milton Wright CNP at      Author: Milton Wright CNP Service: -- Author Type: --    Filed:  Encounter Date: 5/21/2019 Status: (Other)                 Work note  Aureliano Silverio was seen at the doctor on 5/21/2019. Please excuse his absence from work for illness from 5/21/19-5/25/19 unless he's feeling better.   Remarks: If you have any further questions please call our office.    Sincerely,        Milton Wright CNP  5/21/2019  3:14 PM

## 2021-06-21 NOTE — PROGRESS NOTES
"Chief Complaint   Patient presents with     Sore Throat     Pt c/o sore throat, congestion, HA's x 2 days       HPI: 23-year-old male with a past history of septal deviation, broken nose and ENT referrals, presents today with sore throat for 4 days in duration of mild intensity with rhinorrhea and fever.  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    ROS: No vomiting or diarrhea.    SH:    reports that  has never smoked. He does not have any smokeless tobacco history on file. He reports that he does not drink alcohol or use drugs.      FH: The Patient's family history includes No Medical Problems in his father and mother.     Meds:  Aureliano has a current medication list which includes the following prescription(s): penicillin vk.    O:  /70   Pulse (!) 50   Temp 98.1  F (36.7  C)   Resp 16   Ht 5' 8.25\" (1.734 m)   Wt 179 lb 5 oz (81.3 kg)   SpO2 98%   BMI 27.06 kg/m     Constitutional:    --Vitals as above  --No acute distress  Eyes-  --Sclera noninjected  --Lids and conjunctiva normal  ENT-  --TMs clear  --Sclera noninjected  --Pharynxerythematous  Neck-  --Neck supple    Lymph-  --No cervical lymphadenopathy  Lungs-  --Clear to Auscultation  Heart-  --Regular rate and rhythm  Skin-  --Pink and dry          A/P:  1. Sore throat  We will treat with narrow spectrum antibiotics, increase fluids and rest, and follow-up if not improving  - Rapid Strep A Screen-Throat  - Group A Strep, RNA Direct Detection, Throat    2. Pharyngitis, unspecified etiology  Ibuprofen for pain  - penicillin VK (PEN VK) 500 MG tablet; Take 1 tablet (500 mg total) by mouth 4 (four) times a day for 10 days.  Dispense: 40 tablet; Refill: 0    3. Deviated nasal septum  - Ambulatory referral to ENT                                          "

## 2021-06-21 NOTE — PROGRESS NOTES
HPI: This patient is a 24yo M who presents for evaluation of the nasal septal deviation at the request of Dr. Mojica. The patient reports he broke his nose in 2013 which was fixed with surgery.  He then rebroke his nose 1-2 years later and was told to wait until we was done with sports before considering fixing it again.  He's tried Flonase in the past without relief.  Harder to breath out of the right side than the left and is worse when he is sick.  He is motivated to have it fixed.    Past medical history, surgical history, social history, family history, medications, and allergies have been reviewed with the patient and are documented above.    Review of Systems: a 10-system review was performed. Pertinent positives are noted in the HPI and on a separate scanned document in the chart.    PHYSICAL EXAMINATION:  GEN: no acute distress, normocephalic  EYES: extraocular movements are intact, pupils are equal and round. Sclera clear.   EARS: auricles are normally formed. The external auditory canals are clear with minimal to no cerumen. Tympanic membranes are intact bilaterally with no signs of infection, effusion, retractions, or perforations.  NOSE: anterior nares are patent. There are no masses or lesions. C-shaped deformity to the left secondary to right ULC collapse. Septal deviation to the right with right internal nasal valve collapse. Septal mucosa appears dry.  OC/OP: clear, dentition is in good repair. The tongue and palate are fully mobile and symmetric. No masses or lesions.  NECK: soft and supple. No lymphadenopathy or masses. Airway is midline.  NEURO: CN VII and XII symmetric. alert and oriented. No spontaneous nystagmus. Gait is normal.  PULM: breathing comfortably on room air, normal chest expansion with respiration  CARDS: no cyanosis or clubbing. Normal carotid pulses.    MEDICAL DECISION-MAKING: This patient is a 24yo with a deviated septum to the right and internal nasal valve collapse on the  right. He has had surgery on his nose in the past that by his history may have been a septorhino. Will have him see my partner, Dr. Milton Smith, for further evaluation and correction as he has special expertise in this area.  Patient is with good understanding and will make an appointment at his convenience. Patient seen and evaluated with Marybel Garcia PA-C.

## 2021-06-23 NOTE — PATIENT INSTRUCTIONS - HE
BRAT diet is your friend. Bananas, rice, applesauce, toast.    With fluids start with 1 tablespoon of gatorade every 15 minutes.    I've sent some zofran (ondansteron) as well to your pharmacy. Take 1/2 to 1 tablet every 8 hours as needed.    If the diarrhea keeps bothering you after a few days you can try over the counter imodium.    Thank you for coming in today!    If you receive a survey from [employer] about your experience today, it would be very helpful if you could fill it out to let us know what went well and what we can improve!    General Information:    Today you had your appointment with Milton Wright NP    My hours are:    Monday : Out of clinic  Tuesday : 8:00AM - 5:00 PM  Wednesday: 8:00AM - 5:00 PM  Thursday: 8:00AM - 5:00 PM  Friday: 8:00AM - 5:00 PM    I am not in the office Mondays. Therefore non-urgent calls and medical messages received on Monday will be addressed when I am back in the office on Tuesday. Urgent matters will be reviewed and addressed by one of my partners in the office as needed.    If lab work was done today as part of your evaluation you will generally be contacted via [employer], mail, or phone with the results within 1-5 days. If there is an alarming result we will contact you by phone. Lab results come back at varying times, I generally wait until all lab results are available before making comments on the results.     If you need refills please contact your pharmacy. They will send a refill request to me to review. Please allow 3-5 business days for us to process all refill requests.     My Clinical Assistant is Shameka. Please call us at [phone] or send a medical message with any questions or concerns.

## 2021-06-23 NOTE — PROGRESS NOTES
Chief Complaint   Patient presents with     Emesis     Symptoms started around 5am this morning. He states that he has been trying to eat a bit better like eating greens. Did have carlos lettuce yesterday.      Diarrhea     Watery diarrhea.      HPI: Patient presents today with concerns about nausea and vomiting which started around 5 AM this morning.  Since then he has had 5 liquid diarrhea stools, all brown in color along with 3 episodes of emesis.  The vomiting started with gastric contents but now hasincluded bile.  He is tolerating minimal amounts of fluid.  He wonders if it may be related to Papa lettuce that he ate the other night.  No recent alcohol use.  No other known sick contacts.  He is trying to increase his greens in his diet, but that the only major change.  No abdominal pain although he does note some cramping before having the diarrhea stools.  He denies melena/hematochezia/dillon stools.  He has been able to urinate one time today.  Patient did travel to the [facility] in December but had no issues there and after he returned.  Of note, the patient does have a history of inguinal hernia with mesh repair, but denies any abdominal pain in this area.    ROS:Review of Systems - History obtained from the patient  General ROS: positive for  - fatigue  ENT ROS: negative  Hematological and Lymphatic ROS: negative  Respiratory ROS: negative  Cardiovascular ROS: negative  Gastrointestinal ROS: positive for - change in bowel habits, change in stools, diarrhea and nausea/vomiting  Genito-Urinary ROS: negative  Musculoskeletal ROS: negative  Neurological ROS: negative  Dermatological ROS: negative    SH: The Patient's  reports that  has never smoked. he has never used smokeless tobacco. He reports that he does not drink alcohol or use drugs.      FH: The Patient's family history includes No Medical Problems in his father and mother.     Meds:    No current outpatient medications on file prior to visit.     No  "current facility-administered medications on file prior to visit.        O:  /88   Pulse 89   Temp 97.8  F (36.6  C) (Oral)   Ht 5' 7.75\" (1.721 m)   Wt 175 lb (79.4 kg)   SpO2 97%   BMI 26.81 kg/m      Physical Examination:   General appearance - alert, well appearing, and in no distress  Mental status - alert, oriented to person, place, and time  Eyes - pupils equal and reactive, extraocular eye movements intact  Nose - normal and patent, no erythema, discharge or polyps  Mouth - mucous membranes moist, pharynx normal without lesions  Neck - supple, no significant adenopathy  Lymphatics - no palpable lymphadenopathy, no hepatosplenomegaly  Chest - clear to auscultation, no wheezes, rales or rhonchi, symmetric air entry  Heart - normal rate and regular rhythm, S1 and S2 normal, no murmurs noted  Abdomen -hyperactive bowel sounds.  No significant tenderness with palpation.  Neurological - alert, oriented, normal speech, no focal findings or movement disorder noted, neck supple without rigidity, cranial nerves II through XII intact, motor and sensory grossly normal bilaterally, normal muscle tone, no tremors, strength 5/5  Musculoskeletal - no joint tenderness, deformity or swelling  Extremities - peripheral pulses normal, no pedal edema, no clubbing or cyanosis  Skin - normal coloration and turgor, no rashes, no suspicious skin lesions noted      A/P:     Problem List Items Addressed This Visit     None      Visit Diagnoses     Vomiting and diarrhea    -  Primary            1. Vomiting and diarrhea  Most likely viral gastroenteritis.  The recall/E. coli issue surrounding the lettuce was resolved within the last couple of weeks so that is less likely as well.  I encouraged very slowly increasing his fluids due to probable gastroparesis post illness. Increase to BRAT diet as tolerated. Small amount of zofran sent for symptom management. Follow up in the ER for inability to tolerate fluids.    Total time " spent with patient was at least 25 minutes, all of which was spent in counseling and coordination of care regarding their current medical problems.      Milton Wright, CNP

## 2021-06-24 NOTE — PROGRESS NOTES
Chief Complaint   Patient presents with     Fever     x 2 days     Nausea And Vomiting     HPI: Patient presents today with complaints of nausea and vomiting and intermittent chills present for the past 2 days.  He notes a little bit of a dry mouth and his urine production has decreased.  Mild headache.  He feels a little feverish at work.  He gets some mild abdominal pains prior to emesis, but nothing consistent.  No diarrhea or constipation.  The stomach contents initially were just food but have now become like bile.  His last emesis episode was this morning.  Atraumatic.  He does have a prescription of Zofran at home that I prescribed to him previously.    ROS:Review of Systems - History obtained from the patient  General ROS: positive for  - chills and fatigue  Ophthalmic ROS: negative  Hematological and Lymphatic ROS: negative  Respiratory ROS: negative  Cardiovascular ROS: negative  Gastrointestinal ROS: positive for - appetite loss and nausea/vomiting  Genito-Urinary ROS: negative  Neurological ROS: negative    SH: The Patient's  reports that  has never smoked. he has never used smokeless tobacco. He reports that he does not drink alcohol or use drugs.      FH: The Patient's family history includes No Medical Problems in his father and mother.     Meds:    Current Outpatient Medications on File Prior to Visit   Medication Sig Dispense Refill     ondansetron (ZOFRAN) 8 MG tablet Take 0.5-1 tablets (4-8 mg total) by mouth every 8 (eight) hours as needed for nausea. 10 tablet 0     No current facility-administered medications on file prior to visit.        O:  /80 (Patient Site: Left Arm, Patient Position: Sitting, Cuff Size: Adult Regular)   Temp 98.3  F (36.8  C) (Oral)   Wt 169 lb 12.8 oz (77 kg)   BMI 26.01 kg/m      Physical Examination:   General appearance - alert, well appearing, and in no distress  Mental status - alert, oriented to person, place, and time  Mouth - mucous membranes moist,  pharynx normal without lesions  Neck - supple, no significant adenopathy  Lymphatics - no palpable lymphadenopathy, no hepatosplenomegaly  Chest - clear to auscultation, no wheezes, rales or rhonchi, symmetric air entry  Heart - normal rate and regular rhythm, S1 and S2 normal, no murmurs noted  Abdomen - soft, nontender, nondistended, no masses or organomegaly  Neurological - alert, oriented, normal speech, no focal findings or movement disorder noted, neck supple without rigidity, cranial nerves II through XII intact, motor and sensory grossly normal bilaterally, normal muscle tone, no tremors, strength 5/5  Skin - normal coloration and turgor, no rashes, no suspicious skin lesions noted      A/P:     Problem List Items Addressed This Visit     None      Visit Diagnoses     Non-intractable vomiting with nausea, unspecified vomiting type    -  Primary            1. Non-intractable vomiting with nausea, unspecified vomiting type  Most likely viral gastroenteritis.  Last emesis episode was almost 12 hours ago.  He does have a prescription for Zofran on hand at home.  Encouraged him to start using this as needed.  Work note provided.  Discussed the brat diet.  Encouraged no more than a tablespoon every 20 minutes of fluids until his gastroparesis resolves.        Milton Wright, CNP

## 2021-06-24 NOTE — PATIENT INSTRUCTIONS - HE
BRAT diet (banana rice applesauce and toast)    1 tablespoon of fluid every 20 minutes until your stomach empties.      Use that zofran you have at home and call me if you don't have them.    Not showing improvement by Friday? Come back for blood work and re-assessment.    Thank you for coming in today!    If you receive a survey from [employer] about your experience today, it would be very helpful if you could fill it out to let us know what went well and what we can improve!    General Information:    Today you had your appointment with Milton Wright NP    My hours are:    Monday : Out of clinic  Tuesday : 8:00AM - 5:00 PM  Wednesday: 8:00AM - 5:00 PM  Thursday: 8:00AM - 5:00 PM  Friday: 8:00AM - 5:00 PM    I am not in the office Mondays. Therefore non-urgent calls and medical messages received on Monday will be addressed when I am back in the office on Tuesday. Urgent matters will be reviewed and addressed by one of my partners in the office as needed.    If lab work was done today as part of your evaluation you will generally be contacted via [employer], mail, or phone with the results within 1-5 days. If there is an alarming result we will contact you by phone. Lab results come back at varying times, I generally wait until all lab results are available before making comments on the results.     If you need refills please contact your pharmacy. They will send a refill request to me to review. Please allow 3-5 business days for us to process all refill requests.     My Clinical Assistant is Shameka. Please call us at [phone] or send a medical message with any questions or concerns.

## 2021-10-10 ENCOUNTER — HEALTH MAINTENANCE LETTER (OUTPATIENT)
Age: 26
End: 2021-10-10

## 2022-01-30 ENCOUNTER — HEALTH MAINTENANCE LETTER (OUTPATIENT)
Age: 27
End: 2022-01-30

## 2022-09-24 ENCOUNTER — HEALTH MAINTENANCE LETTER (OUTPATIENT)
Age: 27
End: 2022-09-24

## 2023-05-08 ENCOUNTER — HEALTH MAINTENANCE LETTER (OUTPATIENT)
Age: 28
End: 2023-05-08